# Patient Record
Sex: MALE | Race: BLACK OR AFRICAN AMERICAN | Employment: OTHER | URBAN - METROPOLITAN AREA
[De-identification: names, ages, dates, MRNs, and addresses within clinical notes are randomized per-mention and may not be internally consistent; named-entity substitution may affect disease eponyms.]

---

## 2017-02-16 ENCOUNTER — HOSPITAL ENCOUNTER (OUTPATIENT)
Dept: LAB | Age: 67
Discharge: HOME | End: 2017-02-16

## 2017-02-16 LAB — SENTARA SPECIMEN COL,SENBCF: NORMAL

## 2017-02-16 PROCEDURE — 99001 SPECIMEN HANDLING PT-LAB: CPT | Performed by: INTERNAL MEDICINE

## 2017-02-23 ENCOUNTER — HOSPITAL ENCOUNTER (OUTPATIENT)
Dept: VASCULAR SURGERY | Age: 67
Discharge: HOME | End: 2017-02-23
Attending: INTERNAL MEDICINE
Payer: MEDICARE

## 2017-02-23 DIAGNOSIS — I70.213 ATHEROSCLEROSIS OF NATIVE ARTERY OF BOTH LOWER EXTREMITIES WITH INTERMITTENT CLAUDICATION (HCC): ICD-10-CM

## 2017-02-23 PROCEDURE — 93923 UPR/LXTR ART STDY 3+ LVLS: CPT

## 2017-02-23 NOTE — PROCEDURES
[facility]  *** FINAL REPORT ***    Name: Archie Frank  MRN: [id number]    Outpatient  : [id number]  HIS Order #: [id number]  [id number] [facility] Visit #: [id number]  Date: 2017    TYPE OF TEST: Peripheral Arterial Testing    REASON FOR TEST  Claudication (both sides), Limb pain, PVD unspecified    Right Leg  Segmentals: Abnormal                     mmHg  Brachial         138  High thigh  Low thigh         92  Calf              88  Posterior tibial  66  Dorsalis pedis    70  Peroneal  Metatarsal  Toe pressure      37  Doppler:    Abnormal  Ankle/Brachial: 0.51    Site of occlusive disease:-  femoral segment    Left Leg  Segmentals: Abnormal                     mmHg  Brachial         135  High thigh  Low thigh         73  Calf              66  Posterior tibial  59  Dorsalis pedis    68  Peroneal  Metatarsal  Toe pressure  Doppler:    Abnormal  Ankle/Brachial: 0.49    Site of occlusive disease:-  femoral segment    INTERPRETATION/FINDINGS  1. Moderate peripheral arterial disease indicated at rest in the right   leg. 2. Disease is located in the superficial femoral segment on the right  side. 3. Severe peripheral arterial disease indicated at rest in the left  leg. 4. Disease is located in the ilio femoral segment on the left side. 5. The right ankle/brachial index is 0.51 and the left ankle/brachial  index is 0.49.  6. The right digital brachial index is abnormal measuring <.60  suggestive of significant digital disease. 7. The left digital signal is flatline suggestive of small vessel  ischemia. ADDITIONAL COMMENTS  There appears to be some improvement bilaterally since previous  examination done on 8/24/15, more so on the right than left. Patient  had some type of intervention done in . I have personally reviewed the data relevant to the interpretation of  this  study. TECHNOLOGIST: Maryan Stanton. Carolyn Luna  Signed: 2017 09:30 AM    PHYSICIAN: Belgica Reilly.  Alf Foster MD  Signed: 02/23/2017 11:04 PM

## 2017-08-16 ENCOUNTER — HOSPITAL ENCOUNTER (OUTPATIENT)
Dept: LAB | Age: 67
Discharge: HOME | End: 2017-08-16

## 2017-08-16 LAB — SENTARA SPECIMEN COL,SENBCF: NORMAL

## 2017-08-16 PROCEDURE — 99001 SPECIMEN HANDLING PT-LAB: CPT | Performed by: INTERNAL MEDICINE

## 2018-02-06 ENCOUNTER — HOSPITAL ENCOUNTER (OUTPATIENT)
Dept: LAB | Age: 68
Discharge: HOME | End: 2018-02-06

## 2018-02-06 LAB — SENTARA SPECIMEN COL,SENBCF: NORMAL

## 2018-02-06 PROCEDURE — 99001 SPECIMEN HANDLING PT-LAB: CPT | Performed by: INTERNAL MEDICINE

## 2019-03-26 ENCOUNTER — HOSPITAL ENCOUNTER (OUTPATIENT)
Dept: GENERAL RADIOLOGY | Age: 69
Discharge: HOME | End: 2019-03-26
Payer: MEDICARE

## 2019-03-26 ENCOUNTER — HOSPITAL ENCOUNTER (OUTPATIENT)
Dept: LAB | Age: 69
Discharge: HOME | End: 2019-03-26

## 2019-03-26 DIAGNOSIS — R63.4 WEIGHT LOSS: ICD-10-CM

## 2019-03-26 LAB — SENTARA SPECIMEN COL,SENBCF: NORMAL

## 2019-03-26 PROCEDURE — 71046 X-RAY EXAM CHEST 2 VIEWS: CPT

## 2019-03-26 PROCEDURE — 99001 SPECIMEN HANDLING PT-LAB: CPT

## 2019-04-09 ENCOUNTER — HOSPITAL ENCOUNTER (OUTPATIENT)
Dept: LAB | Age: 69
Discharge: HOME | End: 2019-04-09

## 2019-04-09 LAB — SENTARA SPECIMEN COL,SENBCF: NORMAL

## 2019-04-09 PROCEDURE — 99001 SPECIMEN HANDLING PT-LAB: CPT
